# Patient Record
Sex: FEMALE | Race: BLACK OR AFRICAN AMERICAN | Employment: FULL TIME | URBAN - METROPOLITAN AREA
[De-identification: names, ages, dates, MRNs, and addresses within clinical notes are randomized per-mention and may not be internally consistent; named-entity substitution may affect disease eponyms.]

---
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
## 2021-01-21 ENCOUNTER — HOSPITAL ENCOUNTER (EMERGENCY)
Age: 21
Discharge: HOME | End: 2021-01-21
Payer: MEDICAID

## 2021-01-21 VITALS
HEART RATE: 105 BPM | TEMPERATURE: 98.7 F | DIASTOLIC BLOOD PRESSURE: 95 MMHG | RESPIRATION RATE: 16 BRPM | BODY MASS INDEX: 40.48 KG/M2 | OXYGEN SATURATION: 100 % | HEIGHT: 65 IN | WEIGHT: 243 LBS | SYSTOLIC BLOOD PRESSURE: 147 MMHG

## 2021-01-21 DIAGNOSIS — T16.1XXA FOREIGN BODY OF RIGHT EAR, INITIAL ENCOUNTER: Primary | ICD-10-CM

## 2021-01-21 DIAGNOSIS — S00.411A ABRASION OF RIGHT EAR CANAL, INITIAL ENCOUNTER: ICD-10-CM

## 2021-01-21 PROCEDURE — 99283 EMERGENCY DEPT VISIT LOW MDM: CPT

## 2021-01-21 NOTE — Clinical Note
[address] [place] [address] EMERGENCY DEPT 
[address] [address] 
[phone] Work/School Note Date: 1/21/2021 To Whom It May concern: 
 
 
Chitra Kaba was seen and treated today in the emergency room by the following provider(s): 
Physician Assistant: Leena Coy PA-C. Cihtra Kaba is excused from work/school on 01/21/21. She is clear to return to work/school on 01/22/21. Sincerely, Jon Walker PA-C

## 2021-01-21 NOTE — ED PROVIDER NOTES
EMERGENCY DEPARTMENT HISTORY AND PHYSICAL EXAM      Date: 1/21/2021  Patient Name: Brijesh Kwon    History of Presenting Illness     Chief Complaint   Patient presents with   [address] [facility] [address] Foreign Body in Ear       History Provided By: Patient    HPI: Brijesh Kwon, 23 y.o. female with a past medical history significant No significant past medical history presents to the ED with cc of foreign body sensation of right ear. Patient reports while she was sleeping this morning she felt like a bug crawled into her right ear. She tried to flush it out with peroxide and scoop it out with the end of a Vasiliy pin. She was unsuccessful. She states she still feels something moving in the right ear canal.  No other symptoms or complaints at this time. There are no other complaints, changes, or physical findings at this time. PCP: No primary care provider on file. No current facility-administered medications on file prior to encounter. No current outpatient medications on file prior to encounter. Past History     Past Medical History:  No past medical history on file. Past Surgical History:  No past surgical history on file. Family History:  No family history on file. Social History:  Social History     Tobacco Use    Smoking status: Not on file   Substance Use Topics    Alcohol use: Not on file    Drug use: Not on file       Allergies:  No Known Allergies      Review of Systems   Review of Systems   Constitutional: Negative for activity change, chills and fever. HENT: Positive for ear pain. Negative for congestion, rhinorrhea, sneezing and sore throat. Foreign body sensation of ear   Eyes: Negative for pain and visual disturbance. Respiratory: Negative for cough and shortness of breath. Cardiovascular: Negative for chest pain. Gastrointestinal: Negative for abdominal pain, diarrhea, nausea and vomiting. Genitourinary: Negative for dysuria and hematuria.    Musculoskeletal: Negative for gait problem. Skin: Negative for rash. Neurological: Negative for speech difficulty, weakness and headaches. Psychiatric/Behavioral: The patient is not nervous/anxious. All other systems reviewed and are negative. Physical Exam   Physical Exam  Vitals signs and nursing note reviewed. Constitutional:       General: She is not in acute distress. Appearance: Normal appearance. She is not toxic-appearing. HENT:      Head: Normocephalic and atraumatic. Right Ear: A foreign body (insect) is present. Tympanic membrane is not perforated, erythematous or bulging. Left Ear: Tympanic membrane normal.      Ears:      Comments: Right: Mild bleeding and abrasion of the ear canal noted     Nose: Nose normal.      Mouth/Throat:      Mouth: Mucous membranes are moist.   Eyes:      Extraocular Movements: Extraocular movements intact. Conjunctiva/sclera: Conjunctivae normal.      Pupils: Pupils are equal, round, and reactive to light. Neck:      Musculoskeletal: Normal range of motion. Cardiovascular:      Rate and Rhythm: Normal rate. Pulses: Normal pulses. Pulmonary:      Effort: Pulmonary effort is normal. No respiratory distress. Musculoskeletal: Normal range of motion. General: No deformity or signs of injury. Skin:     General: Skin is warm and dry. Capillary Refill: Capillary refill takes less than 2 seconds. Findings: No rash. Neurological:      General: No focal deficit present. Mental Status: She is alert and oriented to person, place, and time. Cranial Nerves: No cranial nerve deficit. Psychiatric:         Mood and Affect: Mood normal.         Diagnostic Study Results     Labs -   No results found for this or any previous visit (from the past 48 hour(s)). Radiologic Studies -   No results found for this or any previous visit.    CT Results  (Last 48 hours)    None          Medical Decision Making   I am the first provider for this patient. I reviewed the vital signs, available nursing notes, past medical history, past surgical history, family history and social history. Vital Signs-Reviewed the patient's vital signs. Patient Vitals for the past 12 hrs:   Temp Pulse Resp BP SpO2   01/21/21 0747 98.7 °F (37.1 °C) (!) 105 16 (!) 147/95 100 %       Records Reviewed: Nursing Notes    Provider Notes (Medical Decision Making):     [facility]    ED Course:   Initial assessment performed. The patients presenting problems have been discussed, and they are in agreement with the care plan formulated and outlined with them. I have encouraged them to ask questions as they arise throughout their visit. PROCEDURES    Foreign Body Removal    Date/Time: 1/21/2021 8:38 AM  Performed by: Agatha Marte PA-C  Authorized by: Agatha Marte PA-C     Consent:     Consent obtained:  Verbal    Consent given by:  Patient    Risks discussed:  Bleeding, incomplete removal and pain    Alternatives discussed:  Alternative treatment and referral  Location:     Location:  Ear    Ear location:  R ear  Pre-procedure details:     Imaging:  None  Procedure details:     Bloodless field: yes      Removal mechanism:  Irrigation and forceps    Foreign bodies recovered:  1    Description:  Bug was removed from right ear canal with irrigation and alligator forceps    Intact foreign body removal: yes    Post-procedure details:     Confirmation:  Residual foreign bodies remain (1 leg from the bug)    Skin closure:  None    Dressing:  Open (no dressing)    Patient tolerance of procedure: Tolerated well, no immediate complications         DISPOSITION    Discharged    PLAN:  1. Current Discharge Medication List      START taking these medications    Details   ciprofloxacin-hydrocortisone (CIPRO HC OTIC) otic suspension Administer 3 Drops in right ear two (2) times a day for 7 days.   Qty: 10 mL, Refills: 0            2.   Follow-up Information     Follow up With Specialties Details Why [address]  Schedule an appointment as soon as possible for a visit  for follow up from ER visit [address] EMERGENCY DEPT Emergency Medicine  As needed, If symptoms worsen [address] [facility] [address] [phone]        Return to ED if worse     Diagnosis     Clinical Impression:   1. Foreign body of right ear, initial encounter    2.  Abrasion of right ear canal, initial encounter

## 2021-01-21 NOTE — ED TRIAGE NOTES
[facility] pt stated that while she was asleep she felt something crawl into her ear; pt stated that she still feels it moving in her ear